# Patient Record
Sex: MALE | Race: BLACK OR AFRICAN AMERICAN | NOT HISPANIC OR LATINO | Employment: FULL TIME | URBAN - METROPOLITAN AREA
[De-identification: names, ages, dates, MRNs, and addresses within clinical notes are randomized per-mention and may not be internally consistent; named-entity substitution may affect disease eponyms.]

---

## 2019-02-15 ENCOUNTER — OFFICE VISIT (OUTPATIENT)
Dept: URGENT CARE | Facility: URGENT CARE | Age: 34
End: 2019-02-15
Payer: COMMERCIAL

## 2019-02-15 VITALS
WEIGHT: 161 LBS | SYSTOLIC BLOOD PRESSURE: 113 MMHG | OXYGEN SATURATION: 100 % | DIASTOLIC BLOOD PRESSURE: 68 MMHG | HEART RATE: 52 BPM | TEMPERATURE: 97.8 F

## 2019-02-15 DIAGNOSIS — J02.9 ACUTE PHARYNGITIS, UNSPECIFIED ETIOLOGY: Primary | ICD-10-CM

## 2019-02-15 PROCEDURE — 99203 OFFICE O/P NEW LOW 30 MIN: CPT | Performed by: NURSE PRACTITIONER

## 2019-02-15 RX ORDER — VALACYCLOVIR HYDROCHLORIDE 500 MG/1
500 TABLET, FILM COATED ORAL 2 TIMES DAILY
Qty: 6 TABLET | Refills: 0 | Status: SHIPPED | OUTPATIENT
Start: 2019-02-15 | End: 2021-01-17

## 2019-02-15 ASSESSMENT — ENCOUNTER SYMPTOMS
FEVER: 0
CHILLS: 0
SORE THROAT: 0
RESPIRATORY NEGATIVE: 1
BRUISES/BLEEDS EASILY: 1
NEUROLOGICAL NEGATIVE: 1
CARDIOVASCULAR NEGATIVE: 1

## 2019-02-16 NOTE — PATIENT INSTRUCTIONS
Patient Education     Canker Sore    A canker sore (also called an aphthous ulcer) is a painful sore on the lining of the mouth. It is most painful during the first few days, and it lasts about 7 to 14 days before going away.  Causes  Canker sores are not cold sores or fever blisters. They are not contagious, so they are not spread by contact. The exact cause of canker sores is not clear, but there are a number of things that can trigger them in different people.    Mild injury, such as biting the inside of the mouth, lip, or cheek, or dental procedures    Stress    Poor diet, or lack of certain nutrients, including B vitamins and iron    Foods that can irritate the mouth, including tomatoes, citrus fruits, and some nuts (foods that are acidic or contain bitter substances called tannins)    Irritating chemicals, such as those in some toothpastes and mouthwashes    Certain chronic illnesses  Symptoms  Canker sores are found on the lining of the mouth. They can be inside the cheeks or lips, on the roof of the mouth, at the base of the gums, on the tongue, or in the back of the throat. Canker sores typically have these characteristics:    Small, flat (not raised) sores    Can be white or yellowish bumps that are red around the edges or have a red halo    Usually small in size, roundish, and in groups    Accompanied by pain or burning  Canker sores don't leave a scar. But they usually come back.  Home care  The goals of canker sore treatment are to decrease the pain, speed healing, and prevent sores from coming back. No single treatment works for everyone. Try a number of methods to see what works best.  General care    You may find that soft, easy-to-chew foods cause less pain. Use a straw to direct liquids away from the sore.    Use a soft-bristle toothbrush, and brush your teeth gently.    Don t eat acidic, salty, or spicy foods.    Don t eat crusty or crunchy foods such as [place] bread or potato chips. These kinds  of foods can hurt the inside of your mouth, or scrape your existing canker sores.  Medicines  You can try over-the-counter medicines that cover the sores and numb them. This protects the sores while they heal and helps reduce pain.  Homemade rinses and solutions  You can use these solutions as mouth rinses. Spit them out after using them. You can also dab them on the sores. You can repeat these treatments as often as needed.    Rinse your mouth with saltwater.    Mix equal amounts of hydrogen peroxide and water. You can use this as a mouthwash or dab it on spots with a cotton swab. You can also add sodium bicarbonate to this to make a paste, and then dab it on spots.  Follow-up care  Follow up with your healthcare provider, or as advised.    If a culture was done, you will be notified if the treatment needs to be changed. You can call as directed for the results.  Call 911  Call 911 if any of these occur:    Trouble breathing    Inability to swallow    Extreme drowsiness or trouble waking up    Fainting or loss of consciousness    Rapid heart rate    Seizure    Stiff neck  When to seek medical advice  Call your healthcare provider right away if any of these occur:    You have a fever of 100.4 F (38 C) or higher, or as directed by your provider    You are pregnant    You just had surgery or another medical procedure, or you were just discharged from the hospital    It's too painful to eat or swallow  Date Last Reviewed: 10/1/2017    [id number] [employer]. [address] [place] [address], [place] [address]. All rights reserved. This information is not intended as a substitute for professional medical care. Always follow your healthcare professional's instructions.

## 2019-02-16 NOTE — PROGRESS NOTES
HPI  Guicho Chavez 33 year old presents with sore in his inner, lower lip. Present for 24 hours. Negative for injury. No treatment PTA in UC.   No past medical history on file.   There is no problem list on file for this patient.    No past surgical history on file.  No Known Allergies  Current Outpatient Medications   Medication     lidocaine VISCOUS (XYLOCAINE) 2 % solution     valACYclovir (VALTREX) 500 MG tablet     No current facility-administered medications for this visit.          Review of Systems   Constitutional: Negative for chills and fever.   HENT: Negative for sore throat.         Oral lesion   Respiratory: Negative.    Cardiovascular: Negative.    Skin: Negative for rash.   Neurological: Negative.    Endo/Heme/Allergies: Bruises/bleeds easily.   All other systems reviewed and are negative.        Physical Exam   Constitutional: He is oriented to person, place, and time. He appears well-developed and well-nourished. No distress.   /68   Pulse 52   Temp 97.8  F (36.6  C) (Tympanic)   Wt 73 kg (161 lb)   SpO2 100%    HENT:   Head: Normocephalic.   Mouth/Throat: Uvula is midline, oropharynx is clear and moist and mucous membranes are normal. Oral lesions present. No tonsillar exudate.       Two discreet oral lesions on the gums inferior to the central lower incisors. They are approximately 3-4 mm in size and shallow ulcerations. Negative for submental or submandibular notes.    Neck: Normal range of motion.   Cardiovascular: Normal rate.   Pulmonary/Chest: Effort normal.   Lymphadenopathy:     He has no cervical adenopathy.   Neurological: He is alert and oriented to person, place, and time.   Skin: Skin is warm and dry. No rash noted.   No hx of cold sores   Nursing note and vitals reviewed.    Assessment:  1. Acute pharyngitis, unspecified etiology        Plan:  Orders Placed This Encounter     valACYclovir (VALTREX) 500 MG tablet     lidocaine VISCOUS (XYLOCAINE) 2 % solution   Ibuprofen 400  mg po QID PRN pain  Instructions regarding self-care of patient with canker sores reviewed.   Written instructions provided in after visit summary and reviewed.  Patient instructed to see primary care provider for new or persistent symptoms.    Please contact pharmacy for medication questions.  Patient instructed to take medications as directed on package.      YAN Cheney, CNP

## 2019-08-23 ENCOUNTER — OFFICE VISIT (OUTPATIENT)
Dept: URGENT CARE | Facility: URGENT CARE | Age: 34
End: 2019-08-23
Payer: COMMERCIAL

## 2019-08-23 VITALS
WEIGHT: 161 LBS | HEART RATE: 74 BPM | DIASTOLIC BLOOD PRESSURE: 58 MMHG | OXYGEN SATURATION: 99 % | SYSTOLIC BLOOD PRESSURE: 102 MMHG | TEMPERATURE: 99.2 F

## 2019-08-23 DIAGNOSIS — G44.209 TENSION HEADACHE: ICD-10-CM

## 2019-08-23 DIAGNOSIS — M54.50 ACUTE LEFT-SIDED LOW BACK PAIN WITHOUT SCIATICA: Primary | ICD-10-CM

## 2019-08-23 PROCEDURE — 99213 OFFICE O/P EST LOW 20 MIN: CPT | Performed by: FAMILY MEDICINE

## 2019-08-23 RX ORDER — NAPROXEN 500 MG/1
500 TABLET ORAL 2 TIMES DAILY WITH MEALS
Qty: 60 TABLET | Refills: 0 | Status: SHIPPED | OUTPATIENT
Start: 2019-08-23 | End: 2021-01-17

## 2019-08-23 NOTE — LETTER
To Whom It MayConcern:       Guicho NO Scott  was seen in our clinic on 8/23/2019        Patient may return to work on   :uncertain .                            Restrictions: unable to work due to pain at this time    Comments: will likely need a few days off work to recover         Provider Signature:         HUSSAIN Ferguson MD

## 2019-08-24 NOTE — PROGRESS NOTES
Subjective: Patient has been working 2 jobs, one as a cook and [place]  Last night he started feeling a little headache on the right side and today at noon it got really painful.  He also at the same time developed left low back pain.  His English is probably not great but I think he is saying that both of these are new problems.  He talked to them at work and they said he needs to get a doctor's note.    Objective: Neurologic exam is within normal limits, ENT is normal, light bothers him on the right side.  Low back exam is significant for some subjective pain on the left side, straight leg test is negative.    Assessment and plan: These are probably overuse injuries, working too many hours, probably needs a couple of days off to recover and full dose naproxen until things calm down.

## 2020-12-16 ENCOUNTER — OFFICE VISIT (OUTPATIENT)
Dept: URGENT CARE | Facility: URGENT CARE | Age: 35
End: 2020-12-16
Payer: COMMERCIAL

## 2020-12-16 VITALS
DIASTOLIC BLOOD PRESSURE: 70 MMHG | OXYGEN SATURATION: 98 % | TEMPERATURE: 98.6 F | HEIGHT: 68 IN | BODY MASS INDEX: 22.82 KG/M2 | SYSTOLIC BLOOD PRESSURE: 106 MMHG | HEART RATE: 60 BPM | WEIGHT: 150.6 LBS

## 2020-12-16 DIAGNOSIS — H57.89 EYE REDNESS: Primary | ICD-10-CM

## 2020-12-16 PROCEDURE — 99214 OFFICE O/P EST MOD 30 MIN: CPT | Performed by: FAMILY MEDICINE

## 2020-12-16 RX ORDER — POLYMYXIN B SULFATE AND TRIMETHOPRIM 1; 10000 MG/ML; [USP'U]/ML
1-2 SOLUTION OPHTHALMIC EVERY 8 HOURS
Qty: 3 ML | Refills: 0 | Status: SHIPPED | OUTPATIENT
Start: 2020-12-16 | End: 2020-12-23

## 2020-12-16 ASSESSMENT — MIFFLIN-ST. JEOR: SCORE: 1582.62

## 2020-12-17 NOTE — PATIENT INSTRUCTIONS
Polytrim eye drops three times a day for 7 days      If symptoms get worse please come in right away to be evaluated      Appointment with Eye specialist ([facility]) to be seen in the next week  [phone]

## 2020-12-17 NOTE — PROGRESS NOTES
"Subjective:   Guicho Chavez is a 35 year old male who presents for   Chief Complaint   Patient presents with     Urgent Care     Eye Problem     For past two days both eyes have been bothering him:  Initially bright light was uncomfortable for him; has been working at [employer] for 20 days this month.  Now has redness, burning; no drainage,  no changes in vision.     Eye Problem     Patient was romed with assistance of [place]ian .     2 days of symptoms where he has noticed some eye redness and streaks of red .   His eyes are not painful. No itchiness of the eyes. There is a slight discomfort.   Absent of drainage from the eye. 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      Current Outpatient Medications   Medication     naproxen (NAPROSYN) 500 MG tablet     trimethoprim-polymyxin b (POLYTRIM) 13751-4.1 UNIT/ML-% ophthalmic solution     valACYclovir (VALTREX) 500 MG tablet     No current facility-administered medications for this visit.      ROS:  As above per HPI    Objective:   /70   Pulse 60   Temp 98.6  F (37  C) (Oral)   Ht 1.727 m (5' 8\")   Wt 68.3 kg (150 lb 9.6 oz)   SpO2 98%   BMI 22.90 kg/m  , Body mass index is 22.9 kg/m .  Gen:  NAD, appears age  HEENT: EOMI, sclera anicteric, Head normocephalic, ; nares patent; moist mucous membranes, minimal hyperemia bilaterally no eye discharge, a small growth on the inside of his left lower eyelid (says present since childhood) , PERRL, medial portion of right eye there is a small fibrous structure on the surface of the eye  Neck: trachea midline, no thyromegaly  CV:  Hemodynamically stable  Pulm:  no increased work of breathing   Skin: no obvious rashes or abnormalities  Psych: Euthymic, linear thoughts, normal rate of speech            No results found " for any visits on 12/16/20.    Assessment & Plan:   Guicho Chavez, 35 year old male who presents with:  Eye redness  Small grey/fibrous structure on the medial portion of the eye extending parly to the middle of this R eye looks like a possible early ptergyium which is certainly a possibility considering his ethnic background and the geography of his original inhabitance.     Both eyes feature very mild redness with no disturbances in vision. No active discharge - discussed this is likely a viral presentation quite possibly spread from the use of shared microscopes in his job.   Polytrim was made available in the event this progresses to eye discharge/worsening eye redness    Appt with ophthalmology ([facility] referral)  To investigate the small structure on this right eye  - trimethoprim-polymyxin b (POLYTRIM) 80098-2.1 UNIT/ML-% ophthalmic solution  Dispense: 3 mL; Refill: 0      Theodore Padilla MD   [place] [facility]    The use of Dragon/[employer] dictation services may have been used to construct the content in this note; any grammatical or spelling errors are non-intentional. Please contact the author of this note directly if you are in need of any clarification.

## 2021-01-17 ENCOUNTER — OFFICE VISIT (OUTPATIENT)
Dept: URGENT CARE | Facility: URGENT CARE | Age: 36
End: 2021-01-17
Payer: COMMERCIAL

## 2021-01-17 VITALS
SYSTOLIC BLOOD PRESSURE: 105 MMHG | HEIGHT: 68 IN | WEIGHT: 150 LBS | HEART RATE: 65 BPM | OXYGEN SATURATION: 100 % | DIASTOLIC BLOOD PRESSURE: 67 MMHG | BODY MASS INDEX: 22.73 KG/M2 | TEMPERATURE: 97.9 F

## 2021-01-17 DIAGNOSIS — H00.014 HORDEOLUM EXTERNUM OF LEFT UPPER EYELID: Primary | ICD-10-CM

## 2021-01-17 PROCEDURE — 99214 OFFICE O/P EST MOD 30 MIN: CPT | Performed by: PHYSICIAN ASSISTANT

## 2021-01-17 RX ORDER — IBUPROFEN 200 MG
200 TABLET ORAL EVERY 4 HOURS PRN
COMMUNITY

## 2021-01-17 RX ORDER — ERYTHROMYCIN 5 MG/G
0.5 OINTMENT OPHTHALMIC AT BEDTIME
Qty: 3.5 G | Refills: 0 | Status: SHIPPED | OUTPATIENT
Start: 2021-01-17 | End: 2021-01-22

## 2021-01-17 ASSESSMENT — MIFFLIN-ST. JEOR: SCORE: 1579.9

## 2021-01-17 NOTE — PROGRESS NOTES
"SUBJECTIVE:  Chief Complaint:   Chief Complaint   Patient presents with     Urgent Care     Pt in clinic to have eval for left eye swelling      Eye Problem     History of Present Illness:  Guicho Chavez is a 35 year old male who presents complaining of moderate left eye eyelid redness, swelling and lump noted for 1 day(s).   Onset/timing: sudden.    Patient denies eye drainage, eye pain, Foreign body sensation, photophobia, rash or headache.   Contact wearer : No    No past medical history on file.  Current Outpatient Medications   Medication Sig Dispense Refill     ibuprofen (ADVIL/MOTRIN) 200 MG tablet Take 200 mg by mouth every 4 hours as needed for mild pain          ROS:  Review of systems negative except as stated above.    OBJECTIVE:  /67   Pulse 65   Temp 97.9  F (36.6  C) (Oral)   Ht 1.727 m (5' 8\")   Wt 68 kg (150 lb)   SpO2 100%   BMI 22.81 kg/m    General: no acute distress  Eye exam: right eye normal lid, conjunctiva, cornea, pupil and fundus. Left upper eye lid swelling. EOMI. Pupils reactive to light. Brown growth on lower lid medial aspect    FACE to FACE 24 minutes   ASSESSMENT / PLAN:  1. Hordeolum externum of left upper eyelid  Frequent warm soaks, use antibiotic ophthalmic ointment as prescribed, and follow up if symptoms persist or worsen. It may take several days for this to resolve.  Patient has eyelid growth on lower lid, another referral to ophthalmology was given to make appointment. Discussed progression to chalazion.      - erythromycin (ROMYCIN) 5 MG/GM ophthalmic ointment; Place 0.5 inches into the right eye At Bedtime for 5 days  Dispense: 3.5 g; Refill: 0  - EYE ADULT REFERRAL; Future      Diagnosis and treatment plan was reviewed with patient and/or family.   We went over any labs or imaging. Discussed worsening symptoms or little to no relief despite treatment plan to follow-up with PCP or return to clinic.  Patient verbalizes understanding. All questions were addressed " and answered.   Angelica Mckay PA-C

## 2021-01-17 NOTE — PATIENT INSTRUCTIONS
Call to make Ophthalmology appointment [phone] for eye growth    Patient Education     Sty (or Stye)  A sty is an infection of the oil gland of the eyelid. It may develop into a small pocket of pus (an abscess). This can cause pain, redness, and swelling. In early stages, a sty is treated with antibiotic cream, eye drops, or a small towel soaked in warm water (a warm compress). More severe cases may need to be opened and drained by a healthcare provider.  Home care    Eye drops or ointment are usually prescribed to treat the infection. Use these as directed.     Artificial tears may also be used to lubricate the eye and make it more comfortable. You can buy these over the counter without a prescription. Talk with your healthcare provider before using any over-the-counter treatment for a sty.    Apply a warm, damp towel to the affected eye for at least 5 minutes, 3 to 4 times a day for a week. Warm compresses open the pores and speed the healing. But if the compresses are too hot, they may burn your eyelid.    Sometimes the sty will drain with this treatment alone. If this happens, keep using the antibiotic until all the redness and swelling are gone.    Wash your hands before and after touching the infected eyelid to avoid spreading the infection.    Don t squeeze or try to break open the sty.  Follow-up care  Follow up with your healthcare provider, or as advised.   When to seek medical advice  Call your healthcare provider right away if any of these occur:    Increase in swelling or redness around the eyelid after 48 to 72 hours    Increase in eye pain or the eyelid blisters    Increase in warmth--the eyelid feels hot    Drainage of blood or thick pus from the sty    Blister on the eyelid    Inability to open the eyelid due to swelling    Fever of 100.4 F (38 C) or above, or as directed by your provider    Vision changes    Headache or stiff neck    The sty comes back  Tyler last reviewed this educational  content on 8/1/2017 2000-2020 [employer]. [address] [place] [address], [place] [address]. All rights reserved. This information is not intended as a substitute for professional medical care. Always follow your healthcare professional's instructions.

## 2024-06-27 ENCOUNTER — APPOINTMENT (OUTPATIENT)
Dept: GENERAL RADIOLOGY | Facility: CLINIC | Age: 39
End: 2024-06-27
Attending: EMERGENCY MEDICINE
Payer: OTHER MISCELLANEOUS

## 2024-06-27 ENCOUNTER — HOSPITAL ENCOUNTER (EMERGENCY)
Facility: CLINIC | Age: 39
Discharge: HOME | End: 2024-06-27
Attending: EMERGENCY MEDICINE | Admitting: EMERGENCY MEDICINE
Payer: OTHER MISCELLANEOUS

## 2024-06-27 VITALS
OXYGEN SATURATION: 99 % | WEIGHT: 154.54 LBS | DIASTOLIC BLOOD PRESSURE: 87 MMHG | SYSTOLIC BLOOD PRESSURE: 133 MMHG | HEIGHT: 68 IN | BODY MASS INDEX: 23.42 KG/M2 | RESPIRATION RATE: 16 BRPM | TEMPERATURE: 98.4 F | HEART RATE: 76 BPM

## 2024-06-27 DIAGNOSIS — S40.012A CONTUSION OF LEFT SHOULDER, INITIAL ENCOUNTER: ICD-10-CM

## 2024-06-27 PROCEDURE — 73030 X-RAY EXAM OF SHOULDER: CPT | Mod: LT

## 2024-06-27 PROCEDURE — 99283 EMERGENCY DEPT VISIT LOW MDM: CPT

## 2024-06-27 ASSESSMENT — COLUMBIA-SUICIDE SEVERITY RATING SCALE - C-SSRS
2. HAVE YOU ACTUALLY HAD ANY THOUGHTS OF KILLING YOURSELF IN THE PAST MONTH?: NO
6. HAVE YOU EVER DONE ANYTHING, STARTED TO DO ANYTHING, OR PREPARED TO DO ANYTHING TO END YOUR LIFE?: NO
1. IN THE PAST MONTH, HAVE YOU WISHED YOU WERE DEAD OR WISHED YOU COULD GO TO SLEEP AND NOT WAKE UP?: NO

## 2024-06-27 ASSESSMENT — ACTIVITIES OF DAILY LIVING (ADL): ADLS_ACUITY_SCORE: 35

## 2024-06-27 NOTE — ED TRIAGE NOTES
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  He has not taken anything for the pain      Triage Assessment (Adult)       Row Name 06/27/24 1748          Triage Assessment    Airway WDL WDL        Respiratory WDL    Respiratory WDL WDL        Skin Circulation/Temperature WDL    Skin Circulation/Temperature WDL WDL        Cardiac WDL    Cardiac WDL WDL        Peripheral/Neurovascular WDL    Peripheral Neurovascular WDL WDL        Cognitive/Neuro/Behavioral WDL    Cognitive/Neuro/Behavioral WDL WDL

## 2024-06-28 NOTE — ED PROVIDER NOTES
"  Emergency Department Note      History of Present Illness     Chief Complaint   Shoulder Injury      HPI   Guicho Chavez is a 39 year old male who presents with shoulder pain. The patient reports that he works as a  and while he was getting a client situated he hit his left shoulder on a large mirror. He explains that he has had progressively worsening pain since then. The patient has not taken ibuprofen or Tylenol for his pain.    Independent Historian   None    Review of External Notes   None    Past Medical History     Medical History and Problem List   Chronic lower back pain    Medications   The patient is not currently taking any prescribed medications.    Physical Exam     Patient Vitals for the past 24 hrs:   BP Temp Temp src Pulse Resp SpO2 Height Weight   06/27/24 2000 133/87 -- -- 76 16 99 % -- --   06/27/24 1746 121/81 98.4  F (36.9  C) Temporal 50 16 100 % 1.727 m (5' 8\") 70.1 kg (154 lb 8.7 oz)     Physical Exam  Vitals and nursing note reviewed.   Constitutional:       General: He is not in acute distress.     Appearance: He is not ill-appearing.   HENT:      Head: Normocephalic and atraumatic.      Right Ear: External ear normal.      Left Ear: External ear normal.      Nose: Nose normal.      Mouth/Throat:      Mouth: Mucous membranes are moist.   Eyes:      Extraocular Movements: Extraocular movements intact.      Conjunctiva/sclera: Conjunctivae normal.   Pulmonary:      Effort: Pulmonary effort is normal. No respiratory distress.   Musculoskeletal:         General: No deformity or signs of injury.      Left shoulder: Tenderness (Tenderness and small abrasion over the superior shoulder) present. No swelling, deformity or crepitus. Normal range of motion.      Cervical back: Normal range of motion and neck supple.   Skin:     General: Skin is warm and dry.      Findings: No rash.   Neurological:      Mental Status: He is alert and oriented to person, place, and time.   Psychiatric:    "      Behavior: Behavior normal.           Diagnostics     Lab Results   Labs Ordered and Resulted from Time of ED Arrival to Time of ED Departure - No data to display    Imaging   XR Shoulder Left G/E 3 Views   Final Result   IMPRESSION: Normal joint spaces and alignment. No fracture.          Independent Interpretation   X-ray left shoulder shows no fracture or dislocation    ED Course      Medications Administered   Medications - No data to display      Discussion of Management   None    Social Determinants of Health adding to complexity of care   None    ED Course   ED Course as of 06/27/24 2105   Thu Jun 27, 2024 2008 I obtained history and examined the patient as noted above       Medical Decision Making / Diagnosis     CMS Diagnoses: None    MIPS   None    MDM   Guicho Chavez is a 39 year old male who presents with a left shoulder injury.  I suspect a contusion.  There is no signs of fracture or dislocation on his x-ray.  There are no other injuries.  He is neurovascular intact left upper extremity.  Recommend RICE and will give him a work note for couple of days off.  We discussed return precautions and recommended primary care follow-up if his symptoms are not improving..    Disposition   The patient was discharged.     Diagnosis     ICD-10-CM    1. Contusion of left shoulder, initial encounter  S40.012A            Discharge Medications   Discharge Medication List as of 6/27/2024  8:23 PM            Scribe Disclosure:  I, Anastacio Cao, am serving as a scribe at 8:08 PM on 6/27/2024 to document services personally performed by Kp Bernabe MD based on my observations and the provider's statements to me.        Kp Bernabe MD  06/27/24 2107

## 2024-08-26 ENCOUNTER — APPOINTMENT (OUTPATIENT)
Dept: INTERPRETER SERVICES | Facility: CLINIC | Age: 39
End: 2024-08-26
Payer: COMMERCIAL

## 2024-08-26 ENCOUNTER — HOSPITAL ENCOUNTER (EMERGENCY)
Facility: CLINIC | Age: 39
Discharge: HOME | End: 2024-08-26
Attending: EMERGENCY MEDICINE | Admitting: EMERGENCY MEDICINE
Payer: OTHER MISCELLANEOUS

## 2024-08-26 VITALS
TEMPERATURE: 98.4 F | WEIGHT: 154.54 LBS | BODY MASS INDEX: 23.5 KG/M2 | RESPIRATION RATE: 18 BRPM | HEART RATE: 72 BPM | DIASTOLIC BLOOD PRESSURE: 85 MMHG | SYSTOLIC BLOOD PRESSURE: 123 MMHG | OXYGEN SATURATION: 100 %

## 2024-08-26 DIAGNOSIS — M67.912 ROTATOR CUFF DYSFUNCTION, LEFT: ICD-10-CM

## 2024-08-26 DIAGNOSIS — M25.512 LEFT SHOULDER PAIN, UNSPECIFIED CHRONICITY: ICD-10-CM

## 2024-08-26 PROCEDURE — 99284 EMERGENCY DEPT VISIT MOD MDM: CPT

## 2024-08-26 RX ORDER — METHYLPREDNISOLONE 4 MG
TABLET, DOSE PACK ORAL
Qty: 21 TABLET | Refills: 0 | Status: SHIPPED | OUTPATIENT
Start: 2024-08-26

## 2024-08-26 RX ORDER — NAPROXEN 500 MG/1
500 TABLET ORAL 2 TIMES DAILY PRN
Qty: 16 TABLET | Refills: 0 | Status: SHIPPED | OUTPATIENT
Start: 2024-08-26 | End: 2024-09-03

## 2024-08-26 RX ORDER — LIDOCAINE 4 G/G
1 PATCH TOPICAL EVERY 24 HOURS
Qty: 7 PATCH | Refills: 0 | Status: SHIPPED | OUTPATIENT
Start: 2024-08-26

## 2024-08-26 ASSESSMENT — COLUMBIA-SUICIDE SEVERITY RATING SCALE - C-SSRS
2. HAVE YOU ACTUALLY HAD ANY THOUGHTS OF KILLING YOURSELF IN THE PAST MONTH?: NO
1. IN THE PAST MONTH, HAVE YOU WISHED YOU WERE DEAD OR WISHED YOU COULD GO TO SLEEP AND NOT WAKE UP?: NO
6. HAVE YOU EVER DONE ANYTHING, STARTED TO DO ANYTHING, OR PREPARED TO DO ANYTHING TO END YOUR LIFE?: NO

## 2024-08-26 ASSESSMENT — ACTIVITIES OF DAILY LIVING (ADL): ADLS_ACUITY_SCORE: 33

## 2024-08-26 NOTE — ED TRIAGE NOTES
Pt here for continued L shoulder pain since hitting it on a mirror in June. Is a . Has not tried anything for pain. Endorses decreased ROM due to pain.

## 2024-08-26 NOTE — ED PROVIDER NOTES
Emergency Department Note      History of Present Illness     Chief Complaint   Shoulder Injury    HPI   Guicho Chavez is a 39 year old male who presents to the Emergency Department for left shoulder pain following an injury in June. The patient reports that the pain is localized to the top of the shoulder and on the lateral inferior portion of the shoulder blade. He notes the pain has been intermittent since he injured it and is exacerbated by driving, lifting his arm, bringing his arm across his body, and resisting movement. He denies any new injuries.    Independent Historian   None    Review of External Notes       Past Medical History     Medical History and Problem List   Chronic midline low back pain without sciatica     Medications   Prilosec    Physical Exam     Patient Vitals for the past 24 hrs:   BP Temp Temp src Pulse Resp SpO2 Weight   08/26/24 1400 123/85 -- -- 72 -- -- --   08/26/24 1214 116/65 98.4  F (36.9  C) Temporal 65 18 100 % 70.1 kg (154 lb 8.7 oz)     Physical Exam  CV: ppi, regular   Resp: speaking in full sentences without any resp distress  Skin: warm dry well perfused  Neuro: Alert, no gross motor or sensory deficits,  gait stable  Extremity: Left upper extremity there is pain with abduction greater than 90 degrees.  Pain with crossarm adduction.  No significant pain with empty can test.  Tenderness palpation over the trapezius muscle.  No overlying wounds or erythema or rash.  Extremity biceps tendon origin, elbow, forearm, hand.  Distal CMS intact.    Diagnostics     Lab Results   Labs Ordered and Resulted from Time of ED Arrival to Time of ED Departure - No data to display    Imaging   No orders to display     EKG   None    Independent Interpretation   None    ED Course      Medications Administered   Medications - No data to display    Procedures   None     Discussion of Management   None    ED Course   ED Course as of 08/26/24 2018   Mon Aug 26, 2024   1421 I evaluated the  patient, obtained history, and performed a physical exam as detailed above. The patient is cleared for discharge.       Additional Documentation  None    Medical Decision Making / Diagnosis     CMS Diagnoses: None    MIPS   None    MDM   Guicho Chavez is a 39 year old male gentleman here with ongoing shoulder pain since June.  No new falls or trauma.  Vitals unremarkable.  I do not think that his presentation suggest an atypical cardiopulmonary etiology.  Presentation is consistent with a musculoskeletal etiology/rotator cuff tendinitis.  Discharged home with supportive therapy and orthopedic follow-up.    Disposition   The patient was discharged.     Diagnosis     ICD-10-CM    1. Left shoulder pain, unspecified chronicity  M25.512 Orthopedic  Referral      2. Rotator cuff dysfunction, left  M67.912 Orthopedic  Referral         Discharge Medications   Discharge Medication List as of 8/26/2024  2:22 PM        START taking these medications    Details   Lidocaine (LIDOCARE) 4 % Patch Place 1 patch onto the skin every 24 hours. To prevent lidocaine toxicity, patient should be patch free for 12 hrs daily.Disp-7 patch, [id number]      Menthol, Topical Analgesic, 4 % GEL Externally apply topically 3 times daily as needed (pain).Disp-89 mL, [id number]      methylPREDNISolone (MEDROL DOSEPAK) 4 MG tablet therapy pack Follow Package Directions, Disp-21 tablet, R-0, E-Prescribe      naproxen (NAPROSYN) 500 MG tablet Take 1 tablet (500 mg) by mouth 2 times daily as needed for moderate pain., Disp-16 tablet, R-0, E-Prescribe           Scribe Disclosure:  I, Leti Wu, am serving as a scribe at 2:30 PM on 8/26/2024 to document services personally performed by Hakan Uribe MD based on my observations and the provider's statements to me.        Hakan Urbie MD  08/26/24 2018

## 2024-09-03 NOTE — PROGRESS NOTES
ASSESSMENT & PLAN    There are no diagnoses linked to this encounter.    {FOLLOW UP PLANS (Optional):883981}    Aashish Thomas MD  [facility] SPORTS MEDICINE [facility]    -----------------------------------------------------------------------------------------      SUBJECTIVE  Guicho Chavez is a/an 39 year old with left shoulder pain.     Reason for Visit:  Injured/painful/body part: ***  Date of injury/Onset: ***  Cause: ***  What are your symptoms: ***  What makes it better: ***  What makes it worse: ***  What have you done for this problem (meds, topicals, ice/heat,injections, therapy): ***  History of similar pain/ previous surgeries: ***{YES/NO:60}    Social History/Occupation: ***      REVIEW OF SYSTEMS:  Positive ROS was noted in the HPI, otherwise negative.       OBJECTIVE:  There were no vitals taken for this visit.   Gen: no acute distress    ***      RADIOLOGY:  {Radiology Reads:273946}

## 2024-09-16 ENCOUNTER — OFFICE VISIT (OUTPATIENT)
Dept: ORTHOPEDICS | Facility: CLINIC | Age: 39
End: 2024-09-16
Attending: EMERGENCY MEDICINE
Payer: COMMERCIAL

## 2024-09-16 VITALS — SYSTOLIC BLOOD PRESSURE: 118 MMHG | DIASTOLIC BLOOD PRESSURE: 72 MMHG

## 2024-09-16 DIAGNOSIS — M67.912 ROTATOR CUFF DYSFUNCTION, LEFT: ICD-10-CM

## 2024-09-16 DIAGNOSIS — G25.89 SCAPULAR DYSKINESIS: Primary | ICD-10-CM

## 2024-09-16 DIAGNOSIS — M25.512 LEFT SHOULDER PAIN, UNSPECIFIED CHRONICITY: ICD-10-CM

## 2024-09-16 PROCEDURE — 99203 OFFICE O/P NEW LOW 30 MIN: CPT | Performed by: STUDENT IN AN ORGANIZED HEALTH CARE EDUCATION/TRAINING PROGRAM

## 2024-09-16 NOTE — LETTER
9/16/2024      Guicho Chavez  [address]  [place] [address]      Dear Colleague,    Thank you for referring your patient, Guicho Chavez, to the [facility]IN[facility] [place]. Please see a copy of my visit note below.    ASSESSMENT & PLAN    Guicho was seen today for pain.    Diagnoses and all orders for this visit:    Scapular dyskinesis  -     Physical Therapy  Referral; Future  -     Sports Med Adult Follow-Up Clinic Order (Blank); Future    Left shoulder pain, unspecified chronicity  -     Orthopedic  Referral  -     Physical Therapy  Referral; Future  -     Sports Med Adult Follow-Up Clinic Order (Blank); Future    Rotator cuff dysfunction, left  -     Orthopedic  Referral  -     Physical Therapy  Referral; Future  -     Sports Med Adult Follow-Up Clinic Order (Blank); Future  -     diclofenac (VOLTAREN) 1 % topical gel; Apply 4 g topically 4 times daily.    Patient is presenting after trauma to his superior shoulder, his exam is limited by pain. There could be an impingement component but he is having significant scapular dyskinesis which may be contributing to dysfunction and stress on the RTC. There is a possibility tht there was injury to nerves dorsal scapular/suprascapular. Recommend rehab and some topical. Follow up in 4 weeks if no improvement.         Aashish Thomas MD  [facility] SPORTS MEDICINE [facility]    -----------------------------------------------------------------------------------------      SUBJECTIVE  Guicho Chavez is a/an 39 year old     Reason for Visit:  Injured/painful/body part: Left shoulder, Supraspinatus, Latissimus Dorsi  Date of injury/Onset: 6/26/2024  Cause: Mechanism: Mirror hit his shoulder  What are your symptoms: Pain, Soreness, Tingling  What makes it better: None, medication short relief  What makes it worse: Turning steering wheel, Lifting heavy objects  What have you done  for this problem (meds, topicals, ice/heat,injections, therapy): Advil  History of similar pain/ previous surgeries: No    Social History/Occupation:       REVIEW OF SYSTEMS:  Positive ROS was noted in the HPI, otherwise negative.       OBJECTIVE:  /72    Gen: no acute distress  Exam is limited to L shoulder:   Inspection:  Shoulder appeared grossly normal without deformity, swelling, or discoloration  No open wounds or rashes appreciated    Palpation:  Subacromial space: negative  AC joint:negative  Clavicle: negative  Proximal biceps tendon: negative    Range of Motion:  Active ROM:  - Forward Flexion: 0-150  - Abduction: 0-120  - External Rotation (elbow at side): 30  - Internal Rotation: T12  Passive ROM:  -No significant difference until scapular assistance    Strength/Special Testing:  Forward flexion 4+/5,  pain elicited  Internal rotation: 4+/5,  pain elicited  External rotation: 4+/5,  pain elicited  Liftoff:4+/5,  pain elicited  Belly press: 4+/5,  pain elicited  Neer s: negative  Hawkin s: negative  Empty can: 4-/5,  pain elicited  Drop arm negative  O Demetrio s: negative  Speed s: positive  Yergason's test: negative  Cross-arm: negative  Apprehension/Relocation: negative  Sulcus: negative  Load and Shift: negative  Scapular Winging:positive  Spurling: negative    Sensation:  Sensation intact appropriately and symmetrically throughout the upper extremity dermatomes        RADIOLOGY:  Previous imaging reviewed and listed are the impressions: XR Left shoulder 6/27/24 Normal joint spaces and alignment. No fracture.       Again, thank you for allowing me to participate in the care of your patient.        Sincerely,        Aashish Thomas MD

## 2024-09-16 NOTE — PROGRESS NOTES
ASSESSMENT & PLAN    Guicho was seen today for pain.    Diagnoses and all orders for this visit:    Scapular dyskinesis  -     Physical Therapy  Referral; Future  -     Sports Med Adult Follow-Up Clinic Order (Blank); Future    Left shoulder pain, unspecified chronicity  -     Orthopedic  Referral  -     Physical Therapy  Referral; Future  -     Sports Med Adult Follow-Up Clinic Order (Blank); Future    Rotator cuff dysfunction, left  -     Orthopedic  Referral  -     Physical Therapy  Referral; Future  -     Sports Med Adult Follow-Up Clinic Order (Blank); Future  -     diclofenac (VOLTAREN) 1 % topical gel; Apply 4 g topically 4 times daily.    Patient is presenting after trauma to his superior shoulder, his exam is limited by pain. There could be an impingement component but he is having significant scapular dyskinesis which may be contributing to dysfunction and stress on the RTC. There is a possibility tht there was injury to nerves dorsal scapular/suprascapular. Recommend rehab and some topical. Follow up in 4 weeks if no improvement.         Aashish Thomas MD  [facility] SP[facility]TS MEDICINE CLINIC [place]    -----------------------------------------------------------------------------------------      SUBJECTIVE  Guicho Chavez is a/an 39 year old     Reason for Visit:  Injured/painful/body part: Left shoulder, Supraspinatus, Latissimus Dorsi  Date of injury/Onset: 6/26/2024  Cause: Mechanism: Mirror hit his shoulder  What are your symptoms: Pain, Soreness, Tingling  What makes it better: None, medication short relief  What makes it worse: Turning steering wheel, Lifting heavy objects  What have you done for this problem (meds, topicals, ice/heat,injections, therapy): Advil  History of similar pain/ previous surgeries: No    Social History/Occupation:       REVIEW OF SYSTEMS:  Positive ROS was noted in the HPI, otherwise negative.        OBJECTIVE:  /72    Gen: no acute distress  Exam is limited to L shoulder:   Inspection:  Shoulder appeared grossly normal without deformity, swelling, or discoloration  No open wounds or rashes appreciated    Palpation:  Subacromial space: negative  AC joint:negative  Clavicle: negative  Proximal biceps tendon: negative    Range of Motion:  Active ROM:  - Forward Flexion: 0-150  - Abduction: 0-120  - External Rotation (elbow at side): 30  - Internal Rotation: T12  Passive ROM:  -No significant difference until scapular assistance    Strength/Special Testing:  Forward flexion 4+/5,  pain elicited  Internal rotation: 4+/5,  pain elicited  External rotation: 4+/5,  pain elicited  Liftoff:4+/5,  pain elicited  Belly press: 4+/5,  pain elicited  Neer s: negative  Hawkin s: negative  Empty can: 4-/5,  pain elicited  Drop arm negative  O Demetrio s: negative  Speed s: positive  Yergason's test: negative  Cross-arm: negative  Apprehension/Relocation: negative  Sulcus: negative  Load and Shift: negative  Scapular Winging:positive  Spurling: negative    Sensation:  Sensation intact appropriately and symmetrically throughout the upper extremity dermatomes        RADIOLOGY:  Previous imaging reviewed and listed are the impressions: XR Left shoulder 6/27/24 Normal joint spaces and alignment. No fracture.

## 2024-09-18 ENCOUNTER — THERAPY VISIT (OUTPATIENT)
Dept: PHYSICAL THERAPY | Facility: CLINIC | Age: 39
End: 2024-09-18
Attending: STUDENT IN AN ORGANIZED HEALTH CARE EDUCATION/TRAINING PROGRAM
Payer: OTHER MISCELLANEOUS

## 2024-09-18 DIAGNOSIS — G25.89 SCAPULAR DYSKINESIS: ICD-10-CM

## 2024-09-18 DIAGNOSIS — M25.512 LEFT SHOULDER PAIN, UNSPECIFIED CHRONICITY: ICD-10-CM

## 2024-09-18 DIAGNOSIS — M67.912 ROTATOR CUFF DYSFUNCTION, LEFT: ICD-10-CM

## 2024-09-18 PROCEDURE — 97161 PT EVAL LOW COMPLEX 20 MIN: CPT | Mod: GP | Performed by: PHYSICAL THERAPIST

## 2024-09-18 PROCEDURE — 97110 THERAPEUTIC EXERCISES: CPT | Mod: GP | Performed by: PHYSICAL THERAPIST

## 2024-09-18 ASSESSMENT — ACTIVITIES OF DAILY LIVING (ADL)
TOUCHING_THE_BACK_OF_YOUR_NECK: 8
WASHING_YOUR_HAIR?: 10
PUTTING_ON_YOUR_PANTS: 5
PUTTING_ON_A_SHIRT_THAT_BUTTONS_DOWN_THE_FRONT: 8
REMOVING_SOMETHING_FROM_YOUR_BACK_POCKET: 9
PLEASE_INDICATE_YOR_PRIMARY_REASON_FOR_REFERRAL_TO_THERAPY:: SHOULDER
PLACING_AN_OBJECT_ON_A_HIGH_SHELF: 8
REACHING_FOR_SOMETHING_ON_A_HIGH_SHELF: 8
CARRYING_A_HEAVY_OBJECT_OF_10_POUNDS: 10
AT_ITS_WORST?: 8
PUTTING_ON_AN_UNDERSHIRT_OR_A_PULLOVER_SWEATER: 8
PUSHING_WITH_THE_INVOLVED_ARM: 8
WASHING_YOUR_BACK: 10
WHEN_LYING_ON_THE_INVOLVED_SIDE: 9

## 2024-09-18 NOTE — PROGRESS NOTES
PHYSICAL THERAPY EVALUATION  Type of Visit: Evaluation              Subjective       Presenting condition or subjective complaint:  Pt presents to PT with a chief complaint of L shoulder pain with reported onset on 6/27/24 after hitting his shoulder onto a mirror on the bus at work. Pt reports hitting the superior aspect of his shoulder on the mirror when standing up. Pt reports pain at upper trapezius/superior scapula with radiating pain shooting down his arm. Pt has attended the E.D x2 for this injury due to significant pain and loss of shoulder motion. Radiographs negative for fracture.     Date of onset: 06/26/24    Relevant medical history:     Dates & types of surgery:  none    Prior diagnostic imaging/testing results: X-ray   radiographs  Prior therapy history for the same diagnosis, illness or injury:    none    Employment:      Hobbies/Interests:      Patient goals for therapy: Having a problem of order my hand as l wentreach overhead/lift    Pain assessment: See objective evaluation for additional pain details     Objective   SHOULDER EVALUATION  PAIN: Pain Level at Rest: 0/10  Pain Level with Use: 8/10  Pain Location: L superior scapula, upper trap, posterior shoulder  Pain Quality: Aching and Sharp  Pain Frequency: intermittent  Pain is Worst: w/ lifting, reaching  Pain is Exacerbated By: lifting, reaching overhead, reaching behind back, looking over his R shoulder  Pain is Relieved By: NSAIDs and rest  Pain Progression: Improved  INTEGUMENTARY (edema, incisions):   POSTURE: Sitting Posture: Rounded shoulders, Forward head  GAIT:   Weightbearing Status:   Assistive Device(s):   Gait Deviations:   BALANCE/PROPRIOCEPTION:   WEIGHTBEARING ALIGNMENT:   ROM:   (Degrees) Left AROM Left PROM Right AROM  Right PROM   Shoulder Flexion 130, pain ++ 140, pain ++ 180    Shoulder Extension       Shoulder Abduction 120, pain ++ 130, pain ++ 180    Shoulder Adduction       Shoulder Internal Rotation Thumb to  T8  Thumb to T4    Shoulder External Rotation 70  80    Shoulder Horizontal Abduction       Shoulder Horizontal Adduction       Shoulder Flexion ER       Shoulder Flexion IR       Elbow Extension       Elbow Flexion       Pain:   End feel:     STRENGTH:   Pain: - none + mild ++ moderate +++ severe  Strength Scale: 0-5/5 Left Right   Shoulder Flexion 4+, + (mild) 5   Shoulder Extension     Shoulder Abduction 4, ++ (mod) 5   Shoulder Adduction     Shoulder Internal Rotation 5 5   Shoulder External Rotation 5 5   Shoulder Horizontal Abduction     Shoulder Horizontal Adduction     Elbow Flexion 5 5   Elbow Extension 5 5   Mid Trap     Lower Trap     Rhomboid     Serratus Anterior       FLEXIBILITY: Decreased upper trap L, Decreased levator L, Decreased pectoralis major L, Decreased pectoralis minor L  SPECIAL TESTS:    Left Right   Impingement     Neer's Positive    Hawkin's-Ricardo     Coracoid Impingement     Internal impingement     Labral     Anterior Slide     Eaton's     Crank     Instability     Apprehension (anterior)     Relocation (anterior)     Anterior Load & Shift     Posterior Load & Shift     Posterior instability (with 90 degrees flex)     Multi-Directional Instability      Sulcus     Biceps      Speed's     Rotator Cuff Tear     Drop Arm Negative     Belly Press Negative     Lift off  Negative       PALPATION:  Significant TTP at acromion and upper trapezius attachment  JOINT MOBILITY:  hypomobile L GH post  CERVICAL SCREEN:  Mod loss of R cervical SB with pain at L upper trape    Assessment & Plan   CLINICAL IMPRESSIONS  Medical Diagnosis: L shoulder pain    Treatment Diagnosis: L shoulder pain and stiffness   Impression/Assessment: Patient is a 39 year old male with L shoulder complaints.  The following significant findings have been identified: Pain, Decreased ROM/flexibility, Decreased joint mobility, Decreased strength, Impaired muscle performance, and Impaired posture. These impairments  interfere with their ability to perform self care tasks, work tasks, recreational activities, household chores, and driving  as compared to previous level of function.     Clinical Decision Making (Complexity):  Clinical Presentation: Evolving/Changing  Clinical Presentation Rationale: based on medical and personal factors listed in PT evaluation  Clinical Decision Making (Complexity): Moderate complexity    PLAN OF CARE  Treatment Interventions:  Interventions: Manual Therapy, Neuromuscular Re-education, Therapeutic Activity, Therapeutic Exercise    Long Term Goals     PT Goal 1  Goal Identifier: Reaching  Goal Description: Pt will be able to reach out to the side overhead painfree  Rationale: to maximize safety and independence with performance of ADLs and functional tasks;to maximize safety and independence within the home;to maximize safety and independence with self cares  Goal Progress: pain 8/10 with reaching to 100 deg  Target Date: 11/14/24      Frequency of Treatment: 1x week  Duration of Treatment: 8 weeks    Recommended Referrals to Other Professionals:   Education Assessment:   Learner/Method: Patient;No Barriers to Learning    Risks and benefits of evaluation/treatment have been explained.   Patient/Family/caregiver agrees with Plan of Care.     Evaluation Time:     PT Eval, Low Complexity Minutes (66700): 25       Signing Clinician: Jose Ramon Lewis, PT

## 2024-09-19 PROBLEM — M25.512 LEFT SHOULDER PAIN, UNSPECIFIED CHRONICITY: Status: ACTIVE | Noted: 2024-09-19

## 2024-10-15 ENCOUNTER — THERAPY VISIT (OUTPATIENT)
Dept: PHYSICAL THERAPY | Facility: CLINIC | Age: 39
End: 2024-10-15
Payer: OTHER MISCELLANEOUS

## 2024-10-15 DIAGNOSIS — M25.512 LEFT SHOULDER PAIN, UNSPECIFIED CHRONICITY: Primary | ICD-10-CM

## 2024-10-15 PROCEDURE — 97140 MANUAL THERAPY 1/> REGIONS: CPT | Mod: GP | Performed by: PHYSICAL THERAPIST

## 2024-10-15 PROCEDURE — 97110 THERAPEUTIC EXERCISES: CPT | Mod: GP | Performed by: PHYSICAL THERAPIST

## 2025-01-13 ENCOUNTER — THERAPY VISIT (OUTPATIENT)
Dept: PHYSICAL THERAPY | Facility: CLINIC | Age: 40
End: 2025-01-13
Payer: OTHER MISCELLANEOUS

## 2025-01-13 DIAGNOSIS — M25.512 LEFT SHOULDER PAIN, UNSPECIFIED CHRONICITY: Primary | ICD-10-CM

## 2025-01-13 PROCEDURE — 97112 NEUROMUSCULAR REEDUCATION: CPT | Mod: GP | Performed by: PHYSICAL THERAPIST

## 2025-01-13 PROCEDURE — 97110 THERAPEUTIC EXERCISES: CPT | Mod: GP | Performed by: PHYSICAL THERAPIST

## 2025-01-13 NOTE — PROGRESS NOTES
PLAN  Continue therapy per current plan of care.    Beginning/End Dates of Progress Note Reporting Period:  09/19/24 to 01/13/2025    Referring Provider:  Aashish Thomas       01/13/25 0500   Appointment Info   Signing clinician's name / credentials Nir Lucero DPT   Total/Authorized Visits E&T 8 planned   Visits Used 3   Medical Diagnosis L shoulder pain   PT Tx Diagnosis L shoulder pain and stiffness   Progress Note/Certification   Onset of illness/injury or Date of Surgery 06/26/24   Therapy Frequency 1x/month   Predicted Duration 12 weeks   Progress Note Due Date 04/07/25   Progress Note Completed Date 09/19/24   PT Goal 1   Goal Identifier Reaching   Goal Description Pt will be able to reach out to the side overhead painfree   Rationale to maximize safety and independence with performance of ADLs and functional tasks;to maximize safety and independence within the home;to maximize safety and independence with self cares   Goal Progress still pain during FL and ABD, no pain during ER and IR   Target Date 04/07/25   Subjective Report   Subjective Report Patient reports improving pain symptoms and ROM. However, still occasional pain with weight bearing on left UE. No pain when sleeping. Still occasional pain with dressing and washing. Also, sometimes has pain with carrying/pushing/lifting heavy objects - heavy wheelchairs. Patient reports he has been working full time with [employer].   Objective Measures   Objective Measures Objective Measure 1   Objective Measure 1   Details left shoulder AROM WNL throughout - pain during FL and ABD; left shoulder strength: FL = 5-/5, ABD = 4/5 and pain, ER = 5-/5, IR = 5-/5, biceps = 5/5   Treatment Interventions (PT)   Interventions Therapeutic Procedure/Exercise;Manual Therapy;Neuromuscular Re-education   Therapeutic Procedure/Exercise   Therapeutic Procedures: strength, endurance, ROM, flexibility minutes (01844) 23   PTRx Ther Proc 1 Wall Slide Shoulder Flexion    PTRx Ther Proc 1 - Details reviewed   PTRx Ther Proc 2 Wall Slides Abduction   PTRx Ther Proc 2 - Details reviewed   PTRx Ther Proc 3 Shoulder Abduction   PTRx Ther Proc 3 - Details 1 lb 15x   PTRx Ther Proc 4 Shoulder Flexion   PTRx Ther Proc 4 - Details 1 lb 15x   Skilled Intervention cues for mm activation and stretch response   PTRx Ther Proc 5 Shoulder External Rotation Sidelying   PTRx Ther Proc 5 - Details 1 lb 15x   PTRx Ther Proc 6 Shoulder Theraband Internal Rotation   PTRx Ther Proc 6 - Details 15x green   Neuromuscular Re-education   PTRx Neuro Re-ed 1 Shoulder Theraband Rows   PTRx Neuro Re-ed 1 - Details 15x green   PTRx Neuro Re-ed 2 Shoulder Theraband Low Row/Pulldown   PTRx Neuro Re-ed 2 - Details 15x green   Neuromuscular re-ed of mvmt, balance, coord, kinesthetic sense, posture, proprioception minutes (88368) 8   Manual Therapy   Manual Therapy Manual Therapy 2   Manual Therapy 1 L AC jt mobs post grade III   Manual Therapy 1 - Details 5 min   Manual Therapy 2 L post RTC MFR   Manual Therapy 2 - Details 5 min   Skilled Intervention for ROM/dec pain   Education   Learner/Method Patient;No Barriers to Learning   Total Session Time   Timed Code Treatment Minutes 31   Total Treatment Time (sum of timed and untimed services) 31

## 2025-03-10 ENCOUNTER — OFFICE VISIT (OUTPATIENT)
Dept: URGENT CARE | Facility: URGENT CARE | Age: 40
End: 2025-03-10
Payer: COMMERCIAL

## 2025-03-10 VITALS
DIASTOLIC BLOOD PRESSURE: 61 MMHG | BODY MASS INDEX: 23.57 KG/M2 | SYSTOLIC BLOOD PRESSURE: 93 MMHG | HEART RATE: 78 BPM | WEIGHT: 155 LBS | OXYGEN SATURATION: 98 % | TEMPERATURE: 98.6 F

## 2025-03-10 DIAGNOSIS — J10.1 INFLUENZA B: Primary | ICD-10-CM

## 2025-03-10 LAB
FLUAV AG SPEC QL IA: NEGATIVE
FLUBV AG SPEC QL IA: POSITIVE

## 2025-03-10 PROCEDURE — 3078F DIAST BP <80 MM HG: CPT | Performed by: PHYSICIAN ASSISTANT

## 2025-03-10 PROCEDURE — 99203 OFFICE O/P NEW LOW 30 MIN: CPT | Performed by: PHYSICIAN ASSISTANT

## 2025-03-10 PROCEDURE — 87804 INFLUENZA ASSAY W/OPTIC: CPT

## 2025-03-10 PROCEDURE — 3074F SYST BP LT 130 MM HG: CPT | Performed by: PHYSICIAN ASSISTANT

## 2025-03-10 RX ORDER — OSELTAMIVIR PHOSPHATE 75 MG/1
75 CAPSULE ORAL 2 TIMES DAILY
Qty: 10 CAPSULE | Refills: 0 | Status: SHIPPED | OUTPATIENT
Start: 2025-03-10 | End: 2025-03-15

## 2025-03-10 NOTE — PROGRESS NOTES
Influenza B  - Influenza A & B Antigen - Clinic Collect  - oseltamivir (TAMIFLU) 75 MG capsule; Take 1 capsule (75 mg) by mouth 2 times daily for 5 days.    General Tips for All Ages:    Rest:  Why: Allows your body to recover.  What to Do:  Get plenty of rest to help your immune system fight the virus.    Hydration:  Why: Helps prevent dehydration and loosens mucus.  What to Do:  Drink fluids like water, herbal teas, and clear broths regularly.    OTC Pain Relievers/Fever Reducers:  Why: Manages fever and eases discomfort.  What to Do:  Take acetaminophen or ibuprofen as directed.    For Children (Under 12 Years):    OTC Pediatric Pain Relievers/Fever Reducers:  Why: Controls fever and discomfort.  What to Do:  Administer acetaminophen or ibuprofen as recommended by your child's pediatrician.    Avoid Aspirin:  Why: May be associated with a rare but serious condition (Reye's syndrome).  What to Do:  Avoid giving aspirin to children with the flu.    For Adolescents (12-17 Years):    OTC Pain Relievers/Fever Reducers:  Why: Manages fever and discomfort.  What to Do:  Take acetaminophen or ibuprofen as directed.    Rest and Sleep:  Why: Supports recovery.  What to Do:  Ensure adequate sleep and rest during the illness.    For Adults (18 Years and Older):    OTC Pain Relievers/Fever Reducers:  Why: Manages fever and eases discomfort.  What to Do:  Take acetaminophen or ibuprofen as directed.    Stay Warm:  Why: Comfort and symptom relief.  What to Do:  Keep yourself warm with blankets and layers.    All Ages:    Humidifier Use:  Why: Adds moisture to the air, easing congestion.  What to Do:  Use a humidifier in your room, especially while sleeping.    Gargle with Saltwater:  Why: Soothes a sore throat.  What to Do:  Gargle with warm saltwater several times a day.    Nasal Saline Drops/Spray (For Congestion):  Why: Helps relieve nasal congestion.  What to Do:  Use saline drops or spray as directed.    Isolate  Yourself:  Why: Prevents the spread of the flu to others.  What to Do:  Stay home until you are fever-free for at least 24 hours without the use of fever-reducing medications.    Follow-Up Care:    Patient advised to return to clinic for reevaluation (either UC or PCP) if symptoms do not improve in 7 days. Patient educated on red flag symptoms and asked to go directly to the ED if these symptoms present themselves.     Antiviral Medications (if prescribed):  Why: May shorten the duration of the flu.  What to Do:  Take antiviral medications if prescribed by your healthcare provider.  Remember, the flu can vary in severity, and individual cases may require different approaches. Follow these guidelines, and if you have concerns or need personalized advice, don't hesitate to contact your healthcare provider.    Wishing you a speedy recovery!      Nuno Jacob PA-C  [facility]    Subjective   39 year old who presents to clinic today for the following health issues:    Headache, Fever, and Generalized Body Aches       HPI     Acute Illness  Acute illness concerns: Headaches, body aches, fatigue, and light headed  Onset/Duration: Ill x 3 days  Symptoms:  Fever: YES  Chills/Sweats: YES  Headache (location?): YES  Sinus Pressure: No  Conjunctivitis:  No  Ear Pain: no  Rhinorrhea: YES  Congestion: YES  Sore Throat: No  Cough: YES  Wheeze: No but some shortness of breath with coughing episodes  Decreased Appetite: No  Nausea: No  Vomiting: No  Diarrhea: No  Dysuria/Freq.: No  Dysuria or Hematuria: No  Fatigue/Achiness: YES  Sick/Strep Exposure: Son is sick  Therapies tried and outcome: Tylenol     Review of Systems   Review of Systems   See HPI    Objective    Temp: 98.6  F (37  C) Temp src: Oral BP: 93/61 Pulse: 78     SpO2: 98 %       Physical Exam   Physical Exam  Constitutional:       General: He is not in acute distress.     Appearance: Normal appearance. He is normal weight. He is not  ill-appearing, toxic-appearing or diaphoretic.   HENT:      Head: Normocephalic and atraumatic.      Right Ear: Tympanic membrane, ear canal and external ear normal. There is no impacted cerumen.      Left Ear: Tympanic membrane, ear canal and external ear normal. There is no impacted cerumen.      Nose: Congestion and rhinorrhea present.      Mouth/Throat:      Pharynx: No oropharyngeal exudate or posterior oropharyngeal erythema.   Cardiovascular:      Rate and Rhythm: Normal rate and regular rhythm.      Pulses: Normal pulses.      Heart sounds: Normal heart sounds. No murmur heard.     No friction rub. No gallop.   Pulmonary:      Effort: Pulmonary effort is normal. No respiratory distress.      Breath sounds: Normal breath sounds. No stridor. No wheezing, rhonchi or rales.   Chest:      Chest wall: No tenderness.   Lymphadenopathy:      Cervical: No cervical adenopathy.   Neurological:      Mental Status: He is alert.   Psychiatric:         Mood and Affect: Mood normal.         Behavior: Behavior normal.         Thought Content: Thought content normal.         Judgment: Judgment normal.          Results for orders placed or performed in visit on 03/10/25 (from the past 24 hours)   Influenza A & B Antigen - Clinic Collect    Specimen: Nose; Swab   Result Value Ref Range    Influenza A antigen Negative Negative    Influenza B antigen Positive (A) Negative    Narrative    Test results must be correlated with clinical data. If necessary, results should be confirmed by a molecular assay or viral culture.

## 2025-03-10 NOTE — LETTER
March 10, 2025      Guicho Chavez  [address]  [place] [address]        To Whom It May Concern:    Guicho Chavez  was seen on 3/10.  Please excuse him today and tomorrow due to illness.        Sincerely,        Nuno Jacob PA-C    Electronically signed

## 2025-08-27 ENCOUNTER — OFFICE VISIT (OUTPATIENT)
Dept: URGENT CARE | Facility: URGENT CARE | Age: 40
End: 2025-08-27

## 2025-08-27 VITALS
RESPIRATION RATE: 16 BRPM | OXYGEN SATURATION: 98 % | SYSTOLIC BLOOD PRESSURE: 104 MMHG | HEART RATE: 58 BPM | BODY MASS INDEX: 23.86 KG/M2 | TEMPERATURE: 98 F | HEIGHT: 67 IN | WEIGHT: 152 LBS | DIASTOLIC BLOOD PRESSURE: 67 MMHG

## 2025-08-27 DIAGNOSIS — J02.9 VIRAL PHARYNGITIS: Primary | ICD-10-CM

## 2025-08-27 DIAGNOSIS — R07.0 THROAT PAIN: ICD-10-CM

## 2025-08-27 LAB
DEPRECATED S PYO AG THROAT QL EIA: NEGATIVE
S PYO DNA THROAT QL NAA+PROBE: NOT DETECTED

## 2025-08-27 PROCEDURE — 99213 OFFICE O/P EST LOW 20 MIN: CPT | Performed by: INTERNAL MEDICINE

## 2025-08-27 PROCEDURE — 87651 STREP A DNA AMP PROBE: CPT | Performed by: INTERNAL MEDICINE

## 2025-08-27 ASSESSMENT — ENCOUNTER SYMPTOMS
SORE THROAT: 1
FEVER: 0
HEADACHES: 0
MYALGIAS: 0
VOMITING: 0
DIAPHORESIS: 0
TROUBLE SWALLOWING: 1
COUGH: 1
CHILLS: 0
DIARRHEA: 0
RHINORRHEA: 1
ADENOPATHY: 0